# Patient Record
Sex: MALE | Race: WHITE | NOT HISPANIC OR LATINO | Employment: FULL TIME | URBAN - METROPOLITAN AREA
[De-identification: names, ages, dates, MRNs, and addresses within clinical notes are randomized per-mention and may not be internally consistent; named-entity substitution may affect disease eponyms.]

---

## 2017-04-06 ENCOUNTER — HOSPITAL ENCOUNTER (EMERGENCY)
Facility: HOSPITAL | Age: 37
Discharge: HOME | End: 2017-04-06
Attending: EMERGENCY MEDICINE | Admitting: EMERGENCY MEDICINE

## 2017-04-06 VITALS
OXYGEN SATURATION: 98 % | TEMPERATURE: 97.9 F | WEIGHT: 190 LBS | HEIGHT: 71 IN | BODY MASS INDEX: 26.6 KG/M2 | SYSTOLIC BLOOD PRESSURE: 144 MMHG | DIASTOLIC BLOOD PRESSURE: 88 MMHG | RESPIRATION RATE: 18 BRPM | HEART RATE: 81 BPM

## 2017-04-06 DIAGNOSIS — S71.111A LACERATION OF RIGHT THIGH, INITIAL ENCOUNTER: Primary | ICD-10-CM

## 2017-04-06 PROCEDURE — 25010000002 TDAP 5-2.5-18.5 LF-MCG/0.5 SUSPENSION: Performed by: EMERGENCY MEDICINE

## 2017-04-06 PROCEDURE — 90471 IMMUNIZATION ADMIN: CPT | Performed by: EMERGENCY MEDICINE

## 2017-04-06 PROCEDURE — 99283 EMERGENCY DEPT VISIT LOW MDM: CPT

## 2017-04-06 PROCEDURE — 90715 TDAP VACCINE 7 YRS/> IM: CPT | Performed by: EMERGENCY MEDICINE

## 2017-04-06 RX ORDER — LIDOCAINE HYDROCHLORIDE AND EPINEPHRINE 10; 10 MG/ML; UG/ML
20 INJECTION, SOLUTION INFILTRATION; PERINEURAL ONCE
Status: COMPLETED | OUTPATIENT
Start: 2017-04-06 | End: 2017-04-06

## 2017-04-06 RX ORDER — CEPHALEXIN 500 MG/1
500 CAPSULE ORAL 3 TIMES DAILY
Qty: 15 CAPSULE | Refills: 0 | Status: SHIPPED | OUTPATIENT
Start: 2017-04-06

## 2017-04-06 RX ADMIN — LIDOCAINE HYDROCHLORIDE,EPINEPHRINE BITARTRATE 20 ML: 10; .01 INJECTION, SOLUTION INFILTRATION; PERINEURAL at 07:24

## 2017-04-06 RX ADMIN — TETANUS TOXOID, REDUCED DIPHTHERIA TOXOID AND ACELLULAR PERTUSSIS VACCINE, ADSORBED 0.5 ML: 5; 2.5; 8; 8; 2.5 SUSPENSION INTRAMUSCULAR at 08:17

## 2017-04-06 NOTE — ED PROVIDER NOTES
EMERGENCY DEPARTMENT ENCOUNTER    CHIEF COMPLAINT  Chief Complaint: Laceration  History given by: Pt  History limited by: N/A  Room Number: 07/07  PMD: No primary care provider on file.      HPI:  Pt is a 36 y.o. male who presents complaining of R thigh laceration sustained around 1 hour ago after accidentally stabbing himself with his pocket knife. Pt states that the knife was clean. His tetanus is out of date.    Duration: 1 hour  Onset: Sudden  Timing: Constant  Location: R thigh  Radiation: None  Quality: Laceration  Intensity/Severity: Moderate  Progression: Unchanged  Associated Symptoms: None  Aggravating Factors: Nothing  Alleviating Factors: Nothing  Previous Episodes: None specified  Treatment before arrival: None specified    PAST MEDICAL HISTORY  Active Ambulatory Problems     Diagnosis Date Noted   • No Active Ambulatory Problems     Resolved Ambulatory Problems     Diagnosis Date Noted   • No Resolved Ambulatory Problems     No Additional Past Medical History       PAST SURGICAL HISTORY  Past Surgical History:   Procedure Laterality Date   • ABDOMINAL SURGERY         FAMILY HISTORY  History reviewed. No pertinent family history.    SOCIAL HISTORY  Social History     Social History   • Marital status: N/A     Spouse name: N/A   • Number of children: N/A   • Years of education: N/A     Occupational History   • Not on file.     Social History Main Topics   • Smoking status: Never Smoker   • Smokeless tobacco: Not on file   • Alcohol use No   • Drug use: No   • Sexual activity: Not on file     Other Topics Concern   • Not on file     Social History Narrative   • No narrative on file       ALLERGIES  Review of patient's allergies indicates no known allergies.    REVIEW OF SYSTEMS  Review of Systems   Constitutional: Negative.    HENT: Negative.    Respiratory: Negative.    Cardiovascular: Negative.    Gastrointestinal: Negative.    Genitourinary: Negative.    Musculoskeletal: Negative.    Skin: Positive  for wound (1.5 cm laceration to R thigh).   All other systems reviewed and are negative.      PHYSICAL EXAM  ED Triage Vitals   Temp Heart Rate Resp BP SpO2   04/06/17 0708 04/06/17 0708 04/06/17 0708 -- 04/06/17 0708   97.9 °F (36.6 °C) 81 16  100 %      Temp src Heart Rate Source Patient Position BP Location FiO2 (%)   04/06/17 0708 04/06/17 0708 -- -- --   Tympanic Monitor          Physical Exam   Constitutional: He is oriented to person, place, and time and well-developed, well-nourished, and in no distress.   HENT:   Head: Normocephalic and atraumatic.   Eyes: EOM are normal. Pupils are equal, round, and reactive to light.   Neck: Normal range of motion. Neck supple.   Cardiovascular: Normal rate, regular rhythm and normal heart sounds.    Pulmonary/Chest: Effort normal and breath sounds normal. No respiratory distress.   Abdominal: Soft. There is no tenderness. There is no rebound and no guarding.   Musculoskeletal: Normal range of motion. He exhibits no edema.   Neurological: He is alert and oriented to person, place, and time. He has normal sensation and normal strength.   Pt is neurovascularly intact distally   Skin: Skin is warm and dry. Laceration noted.   There is a roughly 1.5 cm deep puncture wound to the R thigh.   Psychiatric: Mood and affect normal.   Nursing note and vitals reviewed.      PROCEDURES  Laceration Repair  Date/Time: 4/6/2017 7:35 AM  Performed by: SARAH ZUÑIGA  Authorized by: SARAH ZUÑIGA   Consent: Verbal consent obtained.  Risks and benefits: risks, benefits and alternatives were discussed  Consent given by: patient  Patient understanding: patient states understanding of the procedure being performed  Patient consent: the patient's understanding of the procedure matches consent given  Procedure consent: procedure consent matches procedure scheduled  Patient identity confirmed: verbally with patient  Body area: lower extremity  Location details: right upper leg  Laceration  length: 1.5 cm  Foreign bodies: no foreign bodies  Tendon involvement: none  Nerve involvement: none  Vascular damage: no  Anesthesia: local infiltration    Anesthesia:  Anesthesia: local infiltration  Local Anesthetic: lidocaine 1% with epinephrine   Preparation: Patient was prepped and draped in the usual sterile fashion.  Irrigation solution: saline  Irrigation method: jet lavage  Amount of cleaning: standard  Debridement: none  Degree of undermining: none  Wound skin closure material used: 2-0 Nylon.  Number of sutures: 3  Technique: simple  Approximation: close  Approximation difficulty: simple  Dressing: antibiotic ointment  Patient tolerance: Patient tolerated the procedure well with no immediate complications            PROGRESS AND CONSULTS  ED Course   7:15 AM:  Vitals: HR: 81 Temp: 97.9 °F (36.6 °C) (Tympanic) O2 sat: 100%  D/w pt plan for laceration repair. Pt understands and agrees with the plan, all questions answered.    7:23 AM:  Vitals: BP: 167/114  Rechecked pt. Pt is resting comfortably. Performed laceration repair, see procedure note. Discussed the plan for discharge and the need to have the sutures removed in 10 days. Advised the pt to monitor his blood pressure over the next few days ago to f/u with a PCP if it remains elevated. Provided pt pertinent reasons to RTER including signs of infection. Will provide the pt a prescription for Keflex. Pt understands and agrees with the plan, all questions answered.    MEDICAL DECISION MAKING  Results were reviewed/discussed with the patient and they were also made aware of online access. Pt also made aware that some labs, such as cultures, will not be resulted during ER visit and follow up with PMD is necessary.     MDM       DIAGNOSIS  Final diagnoses:   Laceration of right thigh, initial encounter       DISPOSITION  DISCHARGE    Patient discharged in stable condition.    Reviewed implications of results, diagnosis, meds, responsibility to follow up,  warning signs and symptoms of possible worsening, potential complications and reasons to return to ER, including any new or worsening symptoms.    Patient/Family voiced understanding of above instructions.    Discussed plan for discharge, as there is no emergent indication for admission.  Pt/family is agreeable and understands need for follow up and repeat testing.  Pt is aware that discharge does not mean that nothing is wrong but it indicates no emergency is present that requires admission and they must continue care with follow-up as given below or physician of their choice.     FOLLOW-UP  [facility] REFERRAL SERVICE  [facility] [address] [phone]             Medication List      New Prescriptions          cephalexin 500 MG capsule   Commonly known as:  KEFLEX   Take 1 capsule by mouth 3 (Three) Times a Day.           Latest Documented Vital Signs:  As of 7:46 AM  BP- (!) 167/114 HR- 81 Temp- 97.9 °F (36.6 °C) (Tympanic) O2 sat- 100%    --  Documentation assistance provided by mason Gould for Dr. Mata.  Information recorded by the scribe was done at my direction and has been verified and validated by me.     Reinier Gould  04/06/17 0746       Marciano Mata MD  04/06/17 0797

## 2017-04-06 NOTE — ED NOTES
Pt was opening a box with pocket knife and stabbed himself in right thigh accidentally      Jenni Lei RN  04/06/17 0736

## 2018-03-20 NOTE — DISCHARGE INSTRUCTIONS
Please check BP daily for 5 days and share with your MD.     Subjective:      Carlos Pulido presents with Follow-Up        Subjective: Mr. Pulido returns to the office today for follow-up evaluation of spinal/joints/musculoskeletal pain, nerve pain. The patient notes long history of bilateral knee pain, underwent bilateral knee replacements outside the area, [place]    Since I last saw him, he has been working with physical therapy, notes benefit.    The patient notes right knee pain, primarily on the anterior and medial aspect, worse with activities. The patient underwent right knee replacement in 11/2011.    The patient notes left knee pain, primarily on the anterior medial aspect, worse with activities. Patient underwent left knee replacement in 11/2009.    The patient notes intermittent thigh paresthesias, diagnosed with meralgia paresthetica.    The patient notes intermittent hip pain on the right.    The patient notes intermittent lumbosacral pain, controlled, without radicular component.    The patient notes lower limb nerve pain, noted history of diabetes/neuropathy.    The patient has had prior treatment with medications. He has remotely been to physical therapy. No acute changes with bowel/bladder noted. No acute changes with strength noted. The ongoing pain limits his ability to function. He is inquiring about additional treatment options.      MEDICAL RECORDS REVIEW/DATA REVIEW: Reviewed in epic.     I reviewed medications. Tolerating Lidoderm patch. Using gabapentin 100 mg at night, with benefit, notes sedation, not tolerated during the day. Previously used Lidoderm patch, with benefit.    I reviewed diagnostic studies:     I reviewed radiographs.     Reviewed bilateral knee x-rays 5/2017, showed bilateral knee prosthetics in place without complication, lateral view on left revealed patellar replacement component, no right knee lateral radiographs available for review.    Left shoulder xrays 1/2018 revealed arthritis. Right hip xrays 1/2018 revealed mild  arthritis    Reviewed MRI pelvis 7/2017.    I reviewed lab studies. Reviewed labs 4/2017, including CMP, A1C 9.6. Reviewed BMP 6/2017.    I reviewed medical issues. Followed by primary care provider. Urology consulted regarding history of hematuria.    I reviewed family history: No neuromuscular disorders noted.    I reviewed social issues. Retired       1/29/2018 9:48 AM    HISTORY/REASON FOR EXAM:  Right knee pain.      TECHNIQUE/EXAM DESCRIPTION AND NUMBER OF VIEWS:  2 views of the RIGHT knee.    COMPARISON: 5/16/2017    FINDINGS:  There is a right knee arthroplasty again seen. The femoral and tibial components are intact. However, there is a subtle area of lucency between the medial tibial prosthesis and adjacent bone. There is question of mild more diffuse lucency along the   medial femoral condyle adjacent to the prosthesis. On the lateral view there is no space seen between the femoral and tibial prosthesis. However, this is likely positional as the patient is rotated on the lateral view. On the frontal view the meniscal   spaces appears maintained.    There is no acute fracture or dislocation.    Small bony densities along either side of the joint are likely postoperative.   Impression       1.  There is no radiographic evidence of acute fracture or dislocation.  2.  Right knee prosthesis identified with areas of lucency adjacent to the medial tibial component and medial femoral components raising the possibility of loosening or infection. Recommend correlation to the clinical area of concern.         PAST MEDICAL HISTORY:   Past Medical History:   Diagnosis Date   • Anxiety 4/16/2013    As needed for anxiety   • Hypertension 4/16/2013   • Low testosterone 4/16/2013   • Meralgia paraesthetica, right 12/18/2017   • PATTI (obstructive sleep apnea) 4/16/2013    Cannot tolerate sleep apnea   • Restless legs syndrome (RLS) 4/16/2013       PAST SURGICAL HISTORY:    Past Surgical History:   Procedure  "Laterality Date   • KNEE REPLACEMENT, TOTAL Right 2011    done in [facility]   • KNEE REPLACEMENT, TOTAL Left 2009    done in [facility]   • RHINOPLASTY         ALLERGIES:  Amoxicillin and Hydrocodone    MEDICATIONS:    Outpatient Encounter Prescriptions as of 3/20/2018   Medication Sig Dispense Refill   • PARoxetine (PAXIL) 20 MG Tab TAKE ONE TABLET BY MOUTH ONCE DAILY 90 Tab 1   • metFORMIN ER (GLUCOPHAGE XR) 500 MG TABLET SR 24 HR TAKE TWO TABLETS BY MOUTH TWICE DAILY 360 Tab 1   • insulin NPH (NOVOLIN N RELION) 100 UNIT/ML Suspension Inject 32 Units as instructed 2 Times a Day. (Patient taking differently: Inject 32-35 Units as instructed 2 Times a Day.) 20 mL 6   • insulin regular (NOVOLIN R RELION) 100 Unit/mL Solution Inject 16 Units as instructed 3 times a day before meals. 20 mL 6   • Insulin Pen Needle (BD PEN NEEDLE DAVIE U/F) 32G X 4 MM Misc For insulin shots 4 times a day 150 Each 6   • gabapentin (NEURONTIN) 100 MG Cap TAKE ONE CAPSULE BY MOUTH AT NIGHT FOR 3 DAYS, THEN TWO CAPSULES AT NIGHT FOR 30 DAYS, THEN THREE CAPSULES PER NIGHT THEREAFTER 270 Cap 1   • Diclofenac Sodium (VOLTAREN) 1 % Gel Apply 4 g to skin as directed 3 times a day as needed. 1 Tube 0   • lidocaine (LIDODERM) 5 % Patch Apply 1 Patch to skin as directed every 24 hours. as needed for nerve pain. 30 Patch 2   • lisinopril (PRINIVIL, ZESTRIL) 40 MG tablet TAKE ONE TABLET BY MOUTH ONCE DAILY 90 Tab 3   • hydrochlorothiazide (HYDRODIURIL) 25 MG Tab TAKE ONE TABLET BY MOUTH ONCE DAILY 90 Tab 3   • atorvastatin (LIPITOR) 40 MG Tab TAKE ONE TABLET BY MOUTH ONCE DAILY 90 Tab 3   • amlodipine (NORVASC) 5 MG Tab TAKE ONE TABLET BY MOUTH ONCE DAILY 90 Tab 3   • Insulin Syringe-Needle U-100 (INSULIN SYRINGE .3CC/31GX5/16\") 31G X 5/16\" 0.3 ML Misc 1 Syringe by Does not apply route 5 Times a Day. 200 Each 5   • Lancets Misc Lancets order:   Accucheck Soft Clix lancets  Testing blood sugars 5 times per day for insulin adjustment. " "1 Each 12   • aspirin 81 MG tablet Take 81 mg by mouth every day.     • ACCU-CHEK AMY PLUS strip USE ONE STRIP TO CHECK GLUCOSE 4 TIMES DAILY **E11.65** 150 Strip 6   • pramipexole (MIRAPEX) 1 MG Tab Take 1 Tab by mouth 3 times a day. 90 Tab 2   • RELION INSULIN SYRINGE 31G X 15/64\" 0.3 ML Misc        No facility-administered encounter medications on file as of 3/20/2018.        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   HENT: Negative for ear pain and tinnitus.    Eyes: Negative for pain and discharge.   Respiratory: Negative for hemoptysis and sputum production.    Cardiovascular: Negative for palpitations and orthopnea.   Gastrointestinal: Negative for nausea and vomiting.   Genitourinary: Positive for hematuria.   Musculoskeletal: Positive for back pain, joint pain and myalgias.   Skin: Negative.    Neurological: Positive for tingling and sensory change.   All other systems reviewed and are negative.        Objective:     /82   Pulse 94   Temp 36.9 °C (98.4 °F)   Ht 1.702 m (5' 7\")   " Wt 98 kg (216 lb)   SpO2 95%   BMI 33.83 kg/m²      Physical Exam  Constitutional: oriented to person, place, and time, appears well-developed and well-nourished.   HEENT: Normocephalic atraumatic, neck supple, no JVD noted, no masses noted, no meningeal signs noted  Lymphadenopathy: no cervical, supraclavicular, or inguinal lymphadenopathy noted  Cardiovascular: Intact distal pulses, including at ankles, no limb swelling noted  Pulmonary: No tachypnea noted, no accessory muscle use noted, no dyspnea noted  Abdominal: Soft, nontender, exhibits no distension, no peritoneal signs, no HSM  Musculoskeletal:   Right hip: exhibits only mild tenderness. Minimal pain with range of motion testing, mild tenderness right ASIS region  Left hip: exhibits only mild tenderness. Minimal pain with range of motion testing  Lumbar back: exhibits only mild decreased range of motion, only mild tenderness and only mild pain. negative straight leg testing, trigger points noted  Knee: Healed bilateral knee scars consistent with prior surgeries, tender with palpation primarily medial aspect bilateral knees, also anterior aspect of knees, no signs of infection, no effusion noted, stable with stress testing  Neurological: oriented to person, place, and time. Cranial nerves grossly intact, normal strength. Sensation intact distally. Reflexes 1+ in upper and lower limbs, Gait normal. Able to heel/toe walk, No upper motor neuron signs evident  Skin: Skin is intact. no rashes or lesions noted  Psychiatric: normal mood and affect. speech is normal and behavior is normal. Judgment and thought content normal. Cognition and memory are normal.        Assessment/Plan:       ASSESSMENT:    1. Right knee pain, sprain strain, history of  total knee arthroplasties, lucency adjacent to the medial tibial component and medial femoral components noted on recent xrays      - Ordered CT right knee without contrast  - Ordered lab screen, including CBC, ESR,  CMP  - review/consider orthopedic consult, pending review of imaging    2. Left knee pain, sprain strain,  history of  total knee arthroplasties    - Ordered updated left knee x-rays    3. Right hip pain, sprain strain, mild arthritis    4. Bilateral thigh pain, mild hip arthritis, meralgia paresthetica symptoms    5. Lumbosacral pain, symptomatically controlled    6. Nerve pain, diabetic peripheral neuropathy    7. Comorbid medical issues, including diabetes, history of hematuria, obesity, with care per primary care provider, consultants    - Obtain updated A1c, when next due  - Reviewed dietary/nutrition issues      DISCUSSION/PLAN:    - I discussed management options. I reviewed symptomatic care    - I reviewed home exercise program, with medical precautions    - The patient can consider complementary trials with acupuncture or TENS unit     - I reviewed medication monitoring. For now, continue current medications. I reviewed medication adjustments.    - I reviewed risks, side effects, and interactions of medications, including over-the-counter medications.  I reviewed further symptomatic medications.    - I reviewed additional diagnostic options, including further/advanced imaging, electrodiagnostic testing, vascular studies, and further lab screen    - I reviewed additional therapeutic options, including injection/interventional therapy and additional consultative input     - I reviewed psychosocial interventions    - Coordinate follow-up after review results from above-noted diagnostic studies or an as-needed basis      Please note that this dictation was created using voice recognition software. I have made every reasonable attempt to correct obvious errors but there may be errors of grammar and content that I may have overlooked prior to finalization of this note.